# Patient Record
Sex: MALE | Race: WHITE | NOT HISPANIC OR LATINO
[De-identification: names, ages, dates, MRNs, and addresses within clinical notes are randomized per-mention and may not be internally consistent; named-entity substitution may affect disease eponyms.]

---

## 2017-01-23 ENCOUNTER — APPOINTMENT (OUTPATIENT)
Dept: ELECTROPHYSIOLOGY | Facility: CLINIC | Age: 82
End: 2017-01-23

## 2017-01-23 DIAGNOSIS — I47.2 VENTRICULAR TACHYCARDIA: ICD-10-CM

## 2017-05-16 ENCOUNTER — APPOINTMENT (OUTPATIENT)
Dept: ELECTROPHYSIOLOGY | Facility: CLINIC | Age: 82
End: 2017-05-16

## 2017-05-16 ENCOUNTER — NON-APPOINTMENT (OUTPATIENT)
Age: 82
End: 2017-05-16

## 2017-05-16 VITALS — DIASTOLIC BLOOD PRESSURE: 84 MMHG | SYSTOLIC BLOOD PRESSURE: 144 MMHG | HEART RATE: 60 BPM

## 2017-08-31 ENCOUNTER — APPOINTMENT (OUTPATIENT)
Dept: ELECTROPHYSIOLOGY | Facility: CLINIC | Age: 82
End: 2017-08-31

## 2017-10-26 ENCOUNTER — APPOINTMENT (OUTPATIENT)
Dept: DERMATOLOGY | Facility: CLINIC | Age: 82
End: 2017-10-26
Payer: MEDICARE

## 2017-10-26 VITALS
SYSTOLIC BLOOD PRESSURE: 132 MMHG | WEIGHT: 182 LBS | HEIGHT: 67 IN | BODY MASS INDEX: 28.56 KG/M2 | DIASTOLIC BLOOD PRESSURE: 88 MMHG

## 2017-10-26 DIAGNOSIS — Z12.83 ENCOUNTER FOR SCREENING FOR MALIGNANT NEOPLASM OF SKIN: ICD-10-CM

## 2017-10-26 DIAGNOSIS — L57.0 ACTINIC KERATOSIS: ICD-10-CM

## 2017-10-26 DIAGNOSIS — Z85.828 PERSONAL HISTORY OF OTHER MALIGNANT NEOPLASM OF SKIN: ICD-10-CM

## 2017-10-26 DIAGNOSIS — Z85.820 PERSONAL HISTORY OF MALIGNANT MELANOMA OF SKIN: ICD-10-CM

## 2017-10-26 DIAGNOSIS — L82.1 OTHER SEBORRHEIC KERATOSIS: ICD-10-CM

## 2017-10-26 PROCEDURE — 99203 OFFICE O/P NEW LOW 30 MIN: CPT | Mod: 25

## 2017-10-26 PROCEDURE — 17003 DESTRUCT PREMALG LES 2-14: CPT

## 2017-10-26 PROCEDURE — 17000 DESTRUCT PREMALG LESION: CPT

## 2017-11-17 ENCOUNTER — APPOINTMENT (OUTPATIENT)
Dept: ELECTROPHYSIOLOGY | Facility: CLINIC | Age: 82
End: 2017-11-17
Payer: MEDICARE

## 2017-11-17 ENCOUNTER — NON-APPOINTMENT (OUTPATIENT)
Age: 82
End: 2017-11-17

## 2017-11-17 VITALS — OXYGEN SATURATION: 99 % | HEART RATE: 60 BPM | SYSTOLIC BLOOD PRESSURE: 134 MMHG | DIASTOLIC BLOOD PRESSURE: 86 MMHG

## 2017-11-17 PROCEDURE — 93280 PM DEVICE PROGR EVAL DUAL: CPT

## 2017-12-28 ENCOUNTER — APPOINTMENT (OUTPATIENT)
Dept: OPHTHALMOLOGY | Facility: CLINIC | Age: 82
End: 2017-12-28
Payer: MEDICARE

## 2017-12-28 DIAGNOSIS — L90.8 OTHER ATROPHIC DISORDERS OF SKIN: ICD-10-CM

## 2017-12-28 PROCEDURE — 92004 COMPRE OPH EXAM NEW PT 1/>: CPT

## 2017-12-28 PROCEDURE — 76514 ECHO EXAM OF EYE THICKNESS: CPT

## 2017-12-28 PROCEDURE — 92133 CPTRZD OPH DX IMG PST SGM ON: CPT

## 2017-12-28 RX ORDER — LATANOPROST/PF 0.005 %
0.01 DROPS OPHTHALMIC (EYE)
Qty: 2.5 | Refills: 3 | Status: ACTIVE | COMMUNITY
Start: 2017-12-28 | End: 1900-01-01

## 2018-01-17 ENCOUNTER — APPOINTMENT (OUTPATIENT)
Dept: DERMATOLOGY | Facility: CLINIC | Age: 83
End: 2018-01-17

## 2018-01-18 ENCOUNTER — APPOINTMENT (OUTPATIENT)
Dept: DERMATOLOGY | Facility: CLINIC | Age: 83
End: 2018-01-18

## 2018-01-22 ENCOUNTER — APPOINTMENT (OUTPATIENT)
Dept: ELECTROPHYSIOLOGY | Facility: CLINIC | Age: 83
End: 2018-01-22
Payer: MEDICARE

## 2018-01-22 DIAGNOSIS — I47.1 SUPRAVENTRICULAR TACHYCARDIA: ICD-10-CM

## 2018-01-22 PROCEDURE — 93294 REM INTERROG EVL PM/LDLS PM: CPT

## 2018-02-15 PROBLEM — I47.1 ATRIAL TACHYCARDIA: Status: ACTIVE | Noted: 2017-05-16

## 2018-02-20 ENCOUNTER — APPOINTMENT (OUTPATIENT)
Dept: OPHTHALMOLOGY | Facility: CLINIC | Age: 83
End: 2018-02-20
Payer: MEDICARE

## 2018-02-20 DIAGNOSIS — H04.123 DRY EYE SYNDROME OF BILATERAL LACRIMAL GLANDS: ICD-10-CM

## 2018-02-20 PROCEDURE — 92012 INTRM OPH EXAM EST PATIENT: CPT

## 2018-04-02 ENCOUNTER — RX RENEWAL (OUTPATIENT)
Age: 83
End: 2018-04-02

## 2018-04-02 RX ORDER — LATANOPROST/PF 0.005 %
0.01 DROPS OPHTHALMIC (EYE)
Qty: 2.5 | Refills: 3 | Status: ACTIVE | COMMUNITY
Start: 2018-04-02 | End: 1900-01-01

## 2018-04-23 ENCOUNTER — APPOINTMENT (OUTPATIENT)
Dept: OPHTHALMOLOGY | Facility: CLINIC | Age: 83
End: 2018-04-23
Payer: MEDICARE

## 2018-04-23 DIAGNOSIS — H01.003 UNSPECIFIED BLEPHARITIS RIGHT EYE, UNSPECIFIED EYELID: ICD-10-CM

## 2018-04-23 DIAGNOSIS — H40.10X1 UNSPECIFIED OPEN-ANGLE GLAUCOMA, MILD STAGE: ICD-10-CM

## 2018-04-23 DIAGNOSIS — H25.9 UNSPECIFIED AGE-RELATED CATARACT: ICD-10-CM

## 2018-04-23 DIAGNOSIS — H01.006 UNSPECIFIED BLEPHARITIS RIGHT EYE, UNSPECIFIED EYELID: ICD-10-CM

## 2018-04-23 PROCEDURE — 92083 EXTENDED VISUAL FIELD XM: CPT

## 2018-04-23 PROCEDURE — 92012 INTRM OPH EXAM EST PATIENT: CPT

## 2018-04-26 ENCOUNTER — APPOINTMENT (OUTPATIENT)
Dept: ELECTROPHYSIOLOGY | Facility: CLINIC | Age: 83
End: 2018-04-26

## 2018-05-29 ENCOUNTER — APPOINTMENT (OUTPATIENT)
Dept: ELECTROPHYSIOLOGY | Facility: CLINIC | Age: 83
End: 2018-05-29
Payer: MEDICARE

## 2018-05-29 VITALS
HEART RATE: 61 BPM | HEIGHT: 67 IN | OXYGEN SATURATION: 98 % | SYSTOLIC BLOOD PRESSURE: 148 MMHG | DIASTOLIC BLOOD PRESSURE: 84 MMHG

## 2018-05-29 PROCEDURE — 93280 PM DEVICE PROGR EVAL DUAL: CPT

## 2018-06-28 ENCOUNTER — APPOINTMENT (OUTPATIENT)
Dept: ELECTROPHYSIOLOGY | Facility: CLINIC | Age: 83
End: 2018-06-28
Payer: MEDICARE

## 2018-06-28 VITALS
WEIGHT: 177 LBS | BODY MASS INDEX: 27.78 KG/M2 | SYSTOLIC BLOOD PRESSURE: 112 MMHG | DIASTOLIC BLOOD PRESSURE: 71 MMHG | HEIGHT: 67 IN | HEART RATE: 66 BPM | OXYGEN SATURATION: 96 %

## 2018-06-28 PROCEDURE — 93280 PM DEVICE PROGR EVAL DUAL: CPT

## 2018-07-23 ENCOUNTER — APPOINTMENT (OUTPATIENT)
Dept: OPHTHALMOLOGY | Facility: CLINIC | Age: 83
End: 2018-07-23

## 2018-07-31 ENCOUNTER — APPOINTMENT (OUTPATIENT)
Dept: OPHTHALMOLOGY | Facility: CLINIC | Age: 83
End: 2018-07-31

## 2018-07-31 ENCOUNTER — APPOINTMENT (OUTPATIENT)
Dept: ELECTROPHYSIOLOGY | Facility: CLINIC | Age: 83
End: 2018-07-31

## 2018-08-21 ENCOUNTER — APPOINTMENT (OUTPATIENT)
Dept: OPHTHALMOLOGY | Facility: CLINIC | Age: 83
End: 2018-08-21

## 2018-09-14 ENCOUNTER — APPOINTMENT (OUTPATIENT)
Dept: ELECTROPHYSIOLOGY | Facility: CLINIC | Age: 83
End: 2018-09-14
Payer: MEDICARE

## 2018-09-14 VITALS — HEART RATE: 55 BPM | SYSTOLIC BLOOD PRESSURE: 163 MMHG | DIASTOLIC BLOOD PRESSURE: 97 MMHG

## 2018-09-14 VITALS
HEIGHT: 67 IN | HEART RATE: 60 BPM | DIASTOLIC BLOOD PRESSURE: 90 MMHG | BODY MASS INDEX: 27.15 KG/M2 | SYSTOLIC BLOOD PRESSURE: 174 MMHG | OXYGEN SATURATION: 97 % | WEIGHT: 173 LBS

## 2018-09-14 VITALS
HEIGHT: 67 IN | HEART RATE: 60 BPM | DIASTOLIC BLOOD PRESSURE: 90 MMHG | BODY MASS INDEX: 27.15 KG/M2 | OXYGEN SATURATION: 96 % | SYSTOLIC BLOOD PRESSURE: 174 MMHG | WEIGHT: 173 LBS

## 2018-09-14 PROCEDURE — 93280 PM DEVICE PROGR EVAL DUAL: CPT

## 2018-10-18 ENCOUNTER — APPOINTMENT (OUTPATIENT)
Dept: ELECTROPHYSIOLOGY | Facility: CLINIC | Age: 83
End: 2018-10-18

## 2018-10-18 ENCOUNTER — OUTPATIENT (OUTPATIENT)
Dept: OUTPATIENT SERVICES | Facility: HOSPITAL | Age: 83
LOS: 1 days | End: 2018-10-18
Payer: MEDICARE

## 2018-10-18 VITALS
HEIGHT: 68 IN | OXYGEN SATURATION: 98 % | TEMPERATURE: 98 F | WEIGHT: 175.05 LBS | DIASTOLIC BLOOD PRESSURE: 71 MMHG | SYSTOLIC BLOOD PRESSURE: 150 MMHG | RESPIRATION RATE: 16 BRPM | HEART RATE: 58 BPM

## 2018-10-18 DIAGNOSIS — Z45.02 ENCOUNTER FOR ADJUSTMENT AND MANAGEMENT OF AUTOMATIC IMPLANTABLE CARDIAC DEFIBRILLATOR: ICD-10-CM

## 2018-10-18 DIAGNOSIS — Z95.1 PRESENCE OF AORTOCORONARY BYPASS GRAFT: Chronic | ICD-10-CM

## 2018-10-18 DIAGNOSIS — Z95.0 PRESENCE OF CARDIAC PACEMAKER: Chronic | ICD-10-CM

## 2018-10-18 LAB
ALBUMIN SERPL ELPH-MCNC: 4.6 G/DL — SIGNIFICANT CHANGE UP (ref 3.3–5)
ALP SERPL-CCNC: 51 U/L — SIGNIFICANT CHANGE UP (ref 40–120)
ALT FLD-CCNC: 13 U/L — SIGNIFICANT CHANGE UP (ref 10–45)
ANION GAP SERPL CALC-SCNC: 13 MMOL/L — SIGNIFICANT CHANGE UP (ref 5–17)
AST SERPL-CCNC: 21 U/L — SIGNIFICANT CHANGE UP (ref 10–40)
BILIRUB SERPL-MCNC: 0.5 MG/DL — SIGNIFICANT CHANGE UP (ref 0.2–1.2)
BUN SERPL-MCNC: 22 MG/DL — SIGNIFICANT CHANGE UP (ref 7–23)
CALCIUM SERPL-MCNC: 9.6 MG/DL — SIGNIFICANT CHANGE UP (ref 8.4–10.5)
CHLORIDE SERPL-SCNC: 102 MMOL/L — SIGNIFICANT CHANGE UP (ref 96–108)
CO2 SERPL-SCNC: 24 MMOL/L — SIGNIFICANT CHANGE UP (ref 22–31)
CREAT SERPL-MCNC: 0.91 MG/DL — SIGNIFICANT CHANGE UP (ref 0.5–1.3)
GLUCOSE SERPL-MCNC: 93 MG/DL — SIGNIFICANT CHANGE UP (ref 70–99)
HCT VFR BLD CALC: 42.2 % — SIGNIFICANT CHANGE UP (ref 39–50)
HGB BLD-MCNC: 14.1 G/DL — SIGNIFICANT CHANGE UP (ref 13–17)
MCHC RBC-ENTMCNC: 31.8 PG — SIGNIFICANT CHANGE UP (ref 27–34)
MCHC RBC-ENTMCNC: 33.4 GM/DL — SIGNIFICANT CHANGE UP (ref 32–36)
MCV RBC AUTO: 95.2 FL — SIGNIFICANT CHANGE UP (ref 80–100)
PLATELET # BLD AUTO: 230 K/UL — SIGNIFICANT CHANGE UP (ref 150–400)
POTASSIUM SERPL-MCNC: 3.6 MMOL/L — SIGNIFICANT CHANGE UP (ref 3.5–5.3)
POTASSIUM SERPL-SCNC: 3.6 MMOL/L — SIGNIFICANT CHANGE UP (ref 3.5–5.3)
PROT SERPL-MCNC: 7.9 G/DL — SIGNIFICANT CHANGE UP (ref 6–8.3)
RBC # BLD: 4.44 M/UL — SIGNIFICANT CHANGE UP (ref 4.2–5.8)
RBC # FLD: 13.1 % — SIGNIFICANT CHANGE UP (ref 10.3–14.5)
SODIUM SERPL-SCNC: 139 MMOL/L — SIGNIFICANT CHANGE UP (ref 135–145)
WBC # BLD: 9.3 K/UL — SIGNIFICANT CHANGE UP (ref 3.8–10.5)
WBC # FLD AUTO: 9.3 K/UL — SIGNIFICANT CHANGE UP (ref 3.8–10.5)

## 2018-10-18 PROCEDURE — 93005 ELECTROCARDIOGRAM TRACING: CPT

## 2018-10-18 PROCEDURE — 99203 OFFICE O/P NEW LOW 30 MIN: CPT

## 2018-10-18 PROCEDURE — 33228 REMV&REPLC PM GEN DUAL LEAD: CPT

## 2018-10-18 PROCEDURE — 93010 ELECTROCARDIOGRAM REPORT: CPT

## 2018-10-18 PROCEDURE — C1785: CPT

## 2018-10-18 PROCEDURE — 80053 COMPREHEN METABOLIC PANEL: CPT

## 2018-10-18 PROCEDURE — 85027 COMPLETE CBC AUTOMATED: CPT

## 2018-10-18 RX ORDER — ASPIRIN/CALCIUM CARB/MAGNESIUM 324 MG
1 TABLET ORAL
Qty: 0 | Refills: 0 | COMMUNITY

## 2018-10-18 RX ORDER — CEPHALEXIN 500 MG
1 CAPSULE ORAL
Qty: 9 | Refills: 0 | OUTPATIENT
Start: 2018-10-18 | End: 2018-10-20

## 2018-10-18 RX ORDER — METOPROLOL TARTRATE 50 MG
1 TABLET ORAL
Qty: 0 | Refills: 0 | COMMUNITY

## 2018-10-18 RX ORDER — LATANOPROST 0.05 MG/ML
1 SOLUTION/ DROPS OPHTHALMIC; TOPICAL
Qty: 0 | Refills: 0 | COMMUNITY

## 2018-10-18 RX ORDER — CEPHALEXIN 500 MG
1 CAPSULE ORAL
Qty: 3 | Refills: 0 | OUTPATIENT
Start: 2018-10-18 | End: 2018-10-18

## 2018-10-18 RX ORDER — SIMVASTATIN 20 MG/1
1 TABLET, FILM COATED ORAL
Qty: 0 | Refills: 0 | COMMUNITY

## 2018-10-18 RX ORDER — ZOLPIDEM TARTRATE 10 MG/1
1 TABLET ORAL
Qty: 0 | Refills: 0 | COMMUNITY

## 2018-10-18 NOTE — H&P CARDIOLOGY - PMH
Bradycardia    Coronary artery disease    HLD (hyperlipidemia)    HLD (hyperlipidemia)    NSVT (nonsustained ventricular tachycardia)    Pneumothorax

## 2018-10-18 NOTE — H&P CARDIOLOGY - HISTORY OF PRESENT ILLNESS
This is a 88 yo  male with PMH of HTN, HLD, CAD s/p x4V CABG in 2013, PPM implant ( [employer]) 02/2013, complicated by pneumothorax, NSVT.  Presents here today because PPM is CARLOS in need of PPM generator change.  Presently asymptomatic, denies: chest pain, dyspnea, dizziness, palpitations, N&V, HA.       EKG at last Md visit : Atrial paced. First degree A-V delay. Right bundle branch block with left axis -bifascicular block.   Cardiac Cath: 12/9/2015, Patent VARGHESE to LAD, Patent SVG to LCX. Small SVG to small diag with 80% lesion. Occluded SVG to RCA. OVerall EF is preserved. EF=50% with inf basal akinesis.         Device Check The patient is not pacemaker dependent.   Underlying Rhythm: sinus bradycardia.   Device Type: pacemaker   Pacemaker/ICD : [employer].   Model: Accent DR RF. Serial Number: [id number]. Date of Implant: Feb 12, 2013.   Mode: DDD. Rate: .   Battery Status: Voltage was 2.86 volts The estimated remaining battery life is 21-36 months.   Measurement: Threshold testing was performed.   Atrial: lead impedance was 390 ohms. sensing amplitude was 1.6 mv. Pacing Threshold: 0.75 V @ 0.4 ms.   Rt Ventricle:. lead impedance was 330 (uni) ohms. sensing amplitude was 4.0 mv. Pacing Threshold: 2.0 V @ 1.0 (UNI) ms. This is a 88 yo  male with PMH of HTN, HLD, CAD s/p x4V CABG in 2013, PPM implant ( [employer]) 02/2013, complicated by pneumothorax, NSVT.  Presents here today because PPM is CARLOS in need of PPM generator change.  Presently asymptomatic, denies: chest pain, dyspnea, dizziness, palpitations, N&V, HA.     EKG at last Md visit : Atrial paced. First degree A-V delay. Right bundle branch block with left axis -bifascicular block.   Cardiac Cath: 12/9/2015, Patent VARGHESE to LAD, Patent SVG to LCX. Small SVG to small diag with 80% lesion. Occluded SVG to RCA. OVerall EF is preserved. EF=50% with inf basal akinesis.         Device Check The patient is not pacemaker dependent.   Underlying Rhythm: sinus bradycardia.   Device Type: pacemaker   Pacemaker/ICD : [employer].   Model: Accent DR RF. Serial Number: [id number]. Date of Implant: Feb 12, 2013.   Mode: DDD. Rate: .   Battery Status: Voltage was 2.86 volts The estimated remaining battery life is 21-36 months.   Measurement: Threshold testing was performed.   Atrial: lead impedance was 390 ohms. sensing amplitude was 1.6 mv. Pacing Threshold: 0.75 V @ 0.4 ms.   Rt Ventricle:. lead impedance was 330 (uni) ohms. sensing amplitude was 4.0 mv. Pacing Threshold: 2.0 V @ 1.0 (UNI) ms.

## 2018-10-22 PROBLEM — E78.5 HYPERLIPIDEMIA, UNSPECIFIED: Chronic | Status: ACTIVE | Noted: 2018-10-18

## 2018-10-22 PROBLEM — I47.2 VENTRICULAR TACHYCARDIA: Chronic | Status: ACTIVE | Noted: 2018-10-18

## 2018-10-22 PROBLEM — I25.10 ATHEROSCLEROTIC HEART DISEASE OF NATIVE CORONARY ARTERY WITHOUT ANGINA PECTORIS: Chronic | Status: ACTIVE | Noted: 2018-10-18

## 2018-10-22 PROBLEM — R00.1 BRADYCARDIA, UNSPECIFIED: Chronic | Status: ACTIVE | Noted: 2018-10-18

## 2018-10-22 PROBLEM — J93.9 PNEUMOTHORAX, UNSPECIFIED: Chronic | Status: ACTIVE | Noted: 2018-10-18

## 2018-10-30 ENCOUNTER — RECORD ABSTRACTING (OUTPATIENT)
Age: 83
End: 2018-10-30

## 2018-10-30 DIAGNOSIS — G47.00 INSOMNIA, UNSPECIFIED: ICD-10-CM

## 2018-10-30 DIAGNOSIS — N40.0 BENIGN PROSTATIC HYPERPLASIA WITHOUT LOWER URINARY TRACT SYMPMS: ICD-10-CM

## 2018-10-30 DIAGNOSIS — Z86.79 PERSONAL HISTORY OF OTHER DISEASES OF THE CIRCULATORY SYSTEM: ICD-10-CM

## 2018-10-30 DIAGNOSIS — I70.90 UNSPECIFIED ATHEROSCLEROSIS: ICD-10-CM

## 2018-10-30 DIAGNOSIS — Z86.19 PERSONAL HISTORY OF OTHER INFECTIOUS AND PARASITIC DISEASES: ICD-10-CM

## 2018-10-30 DIAGNOSIS — E78.00 PURE HYPERCHOLESTEROLEMIA, UNSPECIFIED: ICD-10-CM

## 2018-10-30 DIAGNOSIS — Z87.09 PERSONAL HISTORY OF OTHER DISEASES OF THE RESPIRATORY SYSTEM: ICD-10-CM

## 2018-10-30 DIAGNOSIS — C43.9 MALIGNANT MELANOMA OF SKIN, UNSPECIFIED: ICD-10-CM

## 2018-10-30 DIAGNOSIS — I45.3 TRIFASCICULAR BLOCK: ICD-10-CM

## 2018-10-30 DIAGNOSIS — R73.9 HYPERGLYCEMIA, UNSPECIFIED: ICD-10-CM

## 2018-10-30 RX ORDER — ZOLPIDEM TARTRATE 10 MG/1
10 TABLET ORAL
Refills: 0 | Status: ACTIVE | COMMUNITY

## 2018-11-13 ENCOUNTER — APPOINTMENT (OUTPATIENT)
Dept: ELECTROPHYSIOLOGY | Facility: CLINIC | Age: 83
End: 2018-11-13
Payer: MEDICARE

## 2018-11-13 ENCOUNTER — NON-APPOINTMENT (OUTPATIENT)
Age: 83
End: 2018-11-13

## 2018-11-13 VITALS
HEIGHT: 67 IN | DIASTOLIC BLOOD PRESSURE: 88 MMHG | OXYGEN SATURATION: 98 % | HEART RATE: 60 BPM | BODY MASS INDEX: 27.78 KG/M2 | SYSTOLIC BLOOD PRESSURE: 159 MMHG | WEIGHT: 177 LBS

## 2018-11-13 PROCEDURE — 99024 POSTOP FOLLOW-UP VISIT: CPT

## 2018-11-14 ENCOUNTER — APPOINTMENT (OUTPATIENT)
Dept: INTERNAL MEDICINE | Facility: CLINIC | Age: 83
End: 2018-11-14
Payer: MEDICARE

## 2018-11-14 VITALS
DIASTOLIC BLOOD PRESSURE: 85 MMHG | HEIGHT: 67 IN | WEIGHT: 181 LBS | BODY MASS INDEX: 28.41 KG/M2 | HEART RATE: 60 BPM | SYSTOLIC BLOOD PRESSURE: 140 MMHG

## 2018-11-14 DIAGNOSIS — Z95.0 PRESENCE OF CARDIAC PACEMAKER: ICD-10-CM

## 2018-11-14 DIAGNOSIS — I35.0 NONRHEUMATIC AORTIC (VALVE) STENOSIS: ICD-10-CM

## 2018-11-14 PROCEDURE — 99213 OFFICE O/P EST LOW 20 MIN: CPT

## 2018-11-14 NOTE — HISTORY OF PRESENT ILLNESS
[Patient was last seen on ___] : Patient was last seen on [unfilled] [[email or web address]] : S/P battery change\par Post op visit yesterday  Jaron et al   all ok\par PPM working fine\par \par Moving CT ([facility])

## 2019-01-21 ENCOUNTER — MEDICATION RENEWAL (OUTPATIENT)
Age: 84
End: 2019-01-21

## 2019-01-22 ENCOUNTER — APPOINTMENT (OUTPATIENT)
Dept: CARDIOLOGY | Facility: CLINIC | Age: 84
End: 2019-01-22

## 2019-02-15 ENCOUNTER — APPOINTMENT (OUTPATIENT)
Dept: ELECTROPHYSIOLOGY | Facility: CLINIC | Age: 84
End: 2019-02-15

## 2019-05-16 ENCOUNTER — MEDICATION RENEWAL (OUTPATIENT)
Age: 84
End: 2019-05-16

## 2019-05-17 ENCOUNTER — APPOINTMENT (OUTPATIENT)
Dept: ELECTROPHYSIOLOGY | Facility: CLINIC | Age: 84
End: 2019-05-17
Payer: COMMERCIAL

## 2019-05-17 PROCEDURE — 93294 REM INTERROG EVL PM/LDLS PM: CPT

## 2019-05-17 PROCEDURE — 93296 REM INTERROG EVL PM/IDS: CPT

## 2019-07-01 ENCOUNTER — RX RENEWAL (OUTPATIENT)
Age: 84
End: 2019-07-01

## 2019-08-12 ENCOUNTER — RX RENEWAL (OUTPATIENT)
Age: 84
End: 2019-08-12

## 2019-08-19 ENCOUNTER — APPOINTMENT (OUTPATIENT)
Dept: ELECTROPHYSIOLOGY | Facility: CLINIC | Age: 84
End: 2019-08-19

## 2019-11-05 ENCOUNTER — RX RENEWAL (OUTPATIENT)
Age: 84
End: 2019-11-05

## 2020-01-15 ENCOUNTER — RX RENEWAL (OUTPATIENT)
Age: 85
End: 2020-01-15